# Patient Record
Sex: FEMALE | Race: WHITE | Employment: FULL TIME | URBAN - METROPOLITAN AREA
[De-identification: names, ages, dates, MRNs, and addresses within clinical notes are randomized per-mention and may not be internally consistent; named-entity substitution may affect disease eponyms.]

---

## 2020-02-27 ENCOUNTER — APPOINTMENT (OUTPATIENT)
Dept: CT IMAGING | Age: 20
End: 2020-02-27
Attending: PHYSICIAN ASSISTANT
Payer: COMMERCIAL

## 2020-02-27 ENCOUNTER — HOSPITAL ENCOUNTER (EMERGENCY)
Age: 20
Discharge: HOME | End: 2020-02-27
Attending: EMERGENCY MEDICINE
Payer: COMMERCIAL

## 2020-02-27 VITALS
SYSTOLIC BLOOD PRESSURE: 113 MMHG | OXYGEN SATURATION: 100 % | RESPIRATION RATE: 16 BRPM | WEIGHT: 150 LBS | DIASTOLIC BLOOD PRESSURE: 75 MMHG | HEIGHT: 65 IN | TEMPERATURE: 98 F | BODY MASS INDEX: 24.99 KG/M2 | HEART RATE: 75 BPM

## 2020-02-27 DIAGNOSIS — S09.90XA INJURY OF HEAD, INITIAL ENCOUNTER: Primary | ICD-10-CM

## 2020-02-27 LAB — HCG UR QL: NEGATIVE

## 2020-02-27 PROCEDURE — 99284 EMERGENCY DEPT VISIT MOD MDM: CPT

## 2020-02-27 PROCEDURE — 74011250637 HC RX REV CODE- 250/637: Performed by: PHYSICIAN ASSISTANT

## 2020-02-27 PROCEDURE — 81025 URINE PREGNANCY TEST: CPT

## 2020-02-27 PROCEDURE — 70450 CT HEAD/BRAIN W/O DYE: CPT

## 2020-02-27 RX ORDER — ONDANSETRON 4 MG/1
4 TABLET, FILM COATED ORAL
Qty: 15 TAB | Refills: 0 | Status: SHIPPED | OUTPATIENT
Start: 2020-02-27

## 2020-02-27 RX ORDER — ONDANSETRON 4 MG/1
4 TABLET, ORALLY DISINTEGRATING ORAL
Status: COMPLETED | OUTPATIENT
Start: 2020-02-27 | End: 2020-02-27

## 2020-02-27 RX ADMIN — ONDANSETRON 4 MG: 4 TABLET, ORALLY DISINTEGRATING ORAL at 21:55

## 2020-02-28 NOTE — DISCHARGE INSTRUCTIONS
Learning About a Closed Head Injury  What is a closed head injury? A closed head injury happens when your head gets hit hard. The strong force of the blow causes your brain to shake in your skull. This movement can cause the brain to bruise, swell, or tear. Sometimes nerves or blood vessels also get damaged. This can cause bleeding in or around the brain. A concussion is a type of closed head injury. What are the symptoms? If you have a mild concussion, you may have a mild headache or feel \"not quite right. \" These symptoms are common. They usually go away over a few days to 4 weeks. But sometimes after a concussion, you feel like you can't function as well as before the injury. And you have new symptoms. This is called postconcussive syndrome. You may:  · Find it harder to solve problems, think, concentrate, or remember. · Have headaches. · Have changes in your sleep patterns, such as not being able to sleep or sleeping all the time. · Have changes in your personality. · Not be interested in your usual activities. · Feel angry or anxious without a clear reason. · Lose your sense of taste or smell. · Be dizzy, lightheaded, or unsteady. It may be hard to stand or walk. How is a closed head injury treated? Any person who may have a concussion needs to see a doctor. Some people have to stay in the hospital to be watched. Others can go home safely. If you go home, follow your doctor's instructions. He or she will tell you if you need someone to watch you closely for the next 24 hours or longer. Rest is the best treatment. Get plenty of sleep at night. And try to rest during the day. · Avoid activities that are physically or mentally demanding. These include housework, exercise, and schoolwork. And don't play video games, send text messages, or use the computer. You may need to change your school or work schedule to be able to avoid these activities.   · Ask your doctor when it's okay to drive, ride a bike, or operate machinery. · Take an over-the-counter pain medicine, such as acetaminophen (Tylenol), ibuprofen (Advil, Motrin), or naproxen (Aleve). Be safe with medicines. Read and follow all instructions on the label. · Check with your doctor before you use any other medicines for pain. · Do not drink alcohol or use illegal drugs. They can slow recovery. They can also increase your risk of getting a second head injury. Follow-up care is a key part of your treatment and safety. Be sure to make and go to all appointments, and call your doctor if you are having problems. It's also a good idea to know your test results and keep a list of the medicines you take. Where can you learn more? Go to http://andrew-senait.info/. Enter E235 in the search box to learn more about \"Learning About a Closed Head Injury. \"  Current as of: March 28, 2019  Content Version: 12.2  © 0163-2913 [employer]. Care instructions adapted under license by [employer] (which disclaims liability or warranty for this information). If you have questions about a medical condition or this instruction, always ask your healthcare professional. [address] any warranty or liability for your use of this information.

## 2020-02-28 NOTE — ED TRIAGE NOTES
Patient states she was playing with her sister and hit her head on the concrete floor. States she is on blood thinners for multiple diagnoses. C/O headache, left sided hearing loss, light sensitivity. States she also vomited.

## 2020-02-28 NOTE — ED PROVIDER NOTES
EMERGENCY DEPARTMENT HISTORY AND PHYSICAL EXAM    Date: 2/27/2020  Patient Name: Brii Gonzalez    History of Presenting Illness     Chief Complaint   Patient presents with    Headache    Fall       History Provided By: Patient    Chief Complaint: head injury     Additional History (Context):   8:49 PM  Brii Gonzalez is a 23 y.o. female with PMHX factor V [place], PE (on Lovenox) presents to the emergency department C/O head injury. She was playing with her sister and fell onto the ground hitting her head. There is no loss of consciousness but she did vomit. She is complaining of severe headache. She denies any blurred vision or neck pain. She does have some dizziness. PCP: Loco Finch MD        Past History     Past Medical History:  Past Medical History:   Diagnosis Date    [facility]oli ([facility] [address]     Thromboembolus ([facility] [address]        Past Surgical History:  Past Surgical History:   Procedure Laterality Date    HX APPENDECTOMY      HX COLECTOMY         Family History:  History reviewed. No pertinent family history. Social History:  Social History     Tobacco Use    Smoking status: Never Smoker    Smokeless tobacco: Never Used   Substance Use Topics    Alcohol use: Yes    Drug use: Not Currently       Allergies: Allergies   Allergen Reactions    Dilaudid [Hydromorphone] Other (comments)     hallucinations       Review of Systems   Review of Systems   Constitutional: Negative for chills and fever. Eyes: Negative for visual disturbance. Respiratory: Negative for shortness of breath. Cardiovascular: Negative for chest pain. Gastrointestinal: Positive for vomiting. Negative for abdominal pain. Musculoskeletal: Negative for back pain and neck pain. Neurological: Positive for dizziness and headaches. Negative for syncope, facial asymmetry, speech difficulty, weakness, light-headedness and numbness. All other systems reviewed and are negative.       Physical Exam     Vitals:    02/27/20 2043 02/27/20 2254   BP: (!) 139/94 113/75   Pulse: 95 75   Resp: 20 16   Temp: 98.3 °F (36.8 °C) 98 °F (36.7 °C)   SpO2: 100%    Weight: 68 kg (150 lb)    Height: 5' 5\" (1.651 m)      Physical Exam  Vitals signs and nursing note reviewed. Constitutional:       General: She is not in acute distress. Appearance: She is well-developed. Comments: Alert, oriented, sitting up on stretcher in dimly lit room   HENT:      Head: Normocephalic and atraumatic. Comments: No cantu signs or raccoon eyes     Right Ear: Tympanic membrane, ear canal and external ear normal. Tympanic membrane is not perforated, erythematous, retracted or bulging. Left Ear: Tympanic membrane, ear canal and external ear normal. Tympanic membrane is not perforated, erythematous, retracted or bulging. Nose: Nose normal. No mucosal edema or rhinorrhea. Right Sinus: No maxillary sinus tenderness or frontal sinus tenderness. Left Sinus: No maxillary sinus tenderness or frontal sinus tenderness. Mouth/Throat:      Mouth: Mucous membranes are not dry. Pharynx: Uvula midline. No oropharyngeal exudate, posterior oropharyngeal erythema or uvula swelling. Tonsils: No tonsillar abscesses. Eyes:      General: Lids are normal. Vision grossly intact. Extraocular Movements: Extraocular movements intact. Right eye: Normal extraocular motion. Left eye: Normal extraocular motion. Conjunctiva/sclera: Conjunctivae normal.      Pupils: Pupils are equal, round, and reactive to light. Comments: EOMs intact in all directions bilaterally   Neck:      Musculoskeletal: Normal range of motion and neck supple. Comments: C-spine nontender no crepitus or step-off  Cardiovascular:      Rate and Rhythm: Normal rate and regular rhythm. Heart sounds: Normal heart sounds. No murmur. Pulmonary:      Effort: Pulmonary effort is normal. No respiratory distress.       Breath sounds: Normal breath sounds. No wheezing or rales. Abdominal:      General: Bowel sounds are normal.      Palpations: Abdomen is soft. Tenderness: There is no abdominal tenderness. Lymphadenopathy:      Cervical: No cervical adenopathy. Skin:     General: Skin is warm and dry. Findings: No rash. Neurological:      Mental Status: She is alert and oriented to person, place, and time. Psychiatric:         Judgment: Judgment normal.           Diagnostic Study Results     Labs:     Recent Results (from the past 12 hour(s))   HCG URINE, QL. - POC    Collection Time: 02/27/20  9:05 PM   Result Value Ref Range    Pregnancy test,urine (POC) NEGATIVE  NEG         Radiologic Studies:   CT HEAD WO CONT   Final Result   IMPRESSION:      1. No acute intracranial pathology. CT Results  (Last 48 hours)               02/27/20 2127  CT HEAD WO CONT Final result    Impression:  IMPRESSION:       1. No acute intracranial pathology. Narrative:  EXAMINATION:  CT head noncontrast       COMPARISON: None       HISTORY: Head trauma. TECHNIQUE: Noncontrasted axial images were obtained through the patient's brain   from the vertex of the skull through the skull base. Bone and soft tissue   windows were reviewed. One or more dose reduction techniques were used on this   CT: automated exposure control, adjustment of the mAs and/or kVp according to   patient size, and iterative reconstruction techniques. The specific techniques   used on this CT exam have been documented in the patient's electronic medical   record. Digital Imaging and Communications in Medicine (DICOM) format image   data are available to nonaffiliated external healthcare facilities or entities   on a secure, media free, reciprocally searchable basis with patient   authorization for at least a 12-month period after this study. Peggyann Gang FINDINGS: Brain architecture is normal. No mass effect, midline shift or   hemorrhage.  Ventricles are normal in size, position and configuration. No   abnormal extra-axial fluid collections are seen. No territorial signs of   infarct. The bony calvarium appears intact without acute displaced fracture. The   visualized paranasal sinuses and mastoid air cells are aerated. CXR Results  (Last 48 hours)    None          Medical Decision Making   I am the first provider for this patient. I reviewed the vital signs, available nursing notes, past medical history, past surgical history, family history and social history. Vital Signs: Reviewed the patient's vital signs. Pulse Oximetry Analysis: 100% on RA       Records Reviewed: Nursing Notes and Old Medical Records    Procedures:  Procedures    ED Course:   8:49 PM Initial assessment performed. The patients presenting problems have been discussed, and they are in agreement with the care plan formulated and outlined with them. I have encouraged them to ask questions as they arise throughout their visit. Discussion:  Pt presents with headache and dizziness following head injury. Patient is currently on Lovenox for PE. She has no neuro deficits and no evidence of skull fracture on exam.  Head CT shows no acute process. Suspect mild concussion. Instructions for returning to activity given to patient and mother. Strict return precautions given, pt offering no questions or complaints. Diagnosis and Disposition     DISCHARGE NOTE:  Janeen SAMUEL Ivelisse's  results have been reviewed with her. She has been counseled regarding her diagnosis, treatment, and plan. She verbally conveys understanding and agreement of the signs, symptoms, diagnosis, treatment and prognosis and additionally agrees to follow up as discussed. She also agrees with the care-plan and conveys that all of her questions have been answered.   I have also provided discharge instructions for her that include: educational information regarding their diagnosis and treatment, and list of reasons why they would want to return to the ED prior to their follow-up appointment, should her condition change. She has been provided with education for proper emergency department utilization. CLINICAL IMPRESSION:    1. Injury of head, initial encounter        PLAN:  1. D/C Home  2. Discharge Medication List as of 2/27/2020 10:38 PM      START taking these medications    Details   ondansetron hcl (ZOFRAN) 4 mg tablet Take 1 Tab by mouth every eight (8) hours as needed for Nausea. , Print, Disp-15 Tab, R-0           3. Follow-up Information     Follow up With Specialties Details Why Contact Info    [facility]   call for follow up and recheck  [id number] [facility], [address] [facility] [address]    [facility] EMERGENCY DEPT Emergency Medicine  If symptoms worsen [address] [id number] [phone]                 Please note that this dictation was completed with [employer], the computer voice recognition software. Quite often unanticipated grammatical, syntax, homophones, and other interpretive errors are inadvertently transcribed by the computer software. Please disregard these errors. Please excuse any errors that have escaped final proofreading.

## 2020-02-28 NOTE — ED NOTES
I have reviewed discharge instructions with the patient. The patient verbalized understanding. I have reviewed the provider's instructions with the patient, answering all questions to her satisfaction. Discharge medications reviewed with patient and appropriate educational materials and side effects teaching were provided. Patient armband removed and shredded

## 2021-03-13 PROBLEM — 87522002 IRON DEFICIENCY ANEMIA: Status: ACTIVE | Noted: 2021-03-13

## 2021-03-15 ENCOUNTER — DASHBOARD ENCOUNTERS (OUTPATIENT)
Age: 21
End: 2021-03-15

## 2021-03-17 ENCOUNTER — OFFICE VISIT (OUTPATIENT)
Dept: URBAN - METROPOLITAN AREA CLINIC 105 | Facility: CLINIC | Age: 21
End: 2021-03-17

## 2021-03-17 NOTE — HPI-TODAY'S VISIT:
The patient is a /White female who presents in follow-up for possible Celiac Disease.  On 1/15/20, the patient said she was diagnosed with Celiac disease in 2012 at age 11 with an EGD. She stayed on a gluten free diet 4 years, but still had right sided abdominal pain so she liberalized her diet. She had had constant right sided pain since age 11. Initially, it was thought to be appendicitis so she had an appendectomy. During the appendectomy, her colon was 'nicked,' so she went into septic shock and later had a partial colon resection. She still had abdominal pain afterwards, so it was thought to be celiac disease she stated and an EGD was done. At one point she was told that scar tissue in her abdomen was the culprit for her pain. All of this was done at a hospital in [place], [place]. In the past she had been put on gabapentin but with no relief. She did not recall ever having a RUQ US. She had post-prandial diarrhea. She had 1 BM/day. It was watery. She had rectal bleeding 1x/1-2 weeks.   Her last colon was with Dr. Geronimo at [facility] while the previous ones were done in [place]. She took oral iron 2 pills QD. There was no side-effect of constipation she stated.  Labs 1/15/20 - Iron 168. Celiac negative. CBC, CMP, TSH/FT4, ferritin all normal. 11/22/19 - CBC, CMP all normal. 11/29/18 - CMP normal except potassium 3.3.